# Patient Record
(demographics unavailable — no encounter records)

---

## 2025-03-25 NOTE — PHYSICAL EXAM
[de-identified] : General: No acute distress Respiratory: Nonlabored Cardiology: Warm and well perfused   Right wrist      Skin: Intact, no swelling or ecchymosis, minimal TTP over SL Motor: AIN/PIN/M/R/U intact without deficit Sensory: No numbness or tingling, Radial pulse 2+, WWP Wrist ROM equal to noninjured side Negative Lacy's [de-identified] :  3 views of the b/l wrists were obtained and reviewed in clinic showing no fractures or dislocations, preserved joint spaces, no lesions, and no soft tissue abnormalities

## 2025-03-25 NOTE — HISTORY OF PRESENT ILLNESS
[Right] : right hand dominant [FreeTextEntry1] : Date of injury: 3/13/2025 (12 days status post injury)  Patient here for evaluation of right-hand injury sustained from a fall playing basketball.  Patient reports that most of his pain is localized on the dorsal side of the right hand.  No medical treatment thus far. Minimal pain only feels it with wrist extension and weight bearing or when shooting a basketball

## 2025-03-25 NOTE — ASSESSMENT
[FreeTextEntry1] : 22M with right wrist sprain    -Discussed diagnosis, natural history of condition, and treatment options -Avoid weight bearing with wrist extended, reviewed safe wrist positions -Aleve 1 in am and 1 in pm for 2-3 weeks with meals - ice, heat -Slowly return to basketball as tolerated -If no improvement in 2-3 months will return, consider MRI vs steroids inj